# Patient Record
Sex: FEMALE | Race: WHITE | NOT HISPANIC OR LATINO | Employment: PART TIME | URBAN - METROPOLITAN AREA
[De-identification: names, ages, dates, MRNs, and addresses within clinical notes are randomized per-mention and may not be internally consistent; named-entity substitution may affect disease eponyms.]

---

## 2017-04-12 PROCEDURE — G0145 SCR C/V CYTO,THINLAYER,RESCR: HCPCS | Performed by: OBSTETRICS & GYNECOLOGY

## 2017-04-13 ENCOUNTER — LAB REQUISITION (OUTPATIENT)
Dept: LAB | Facility: HOSPITAL | Age: 42
End: 2017-04-13
Payer: COMMERCIAL

## 2017-04-13 DIAGNOSIS — Z11.51 ENCOUNTER FOR SCREENING FOR HUMAN PAPILLOMAVIRUS (HPV): ICD-10-CM

## 2017-04-13 DIAGNOSIS — Z12.72 ENCOUNTER FOR SCREENING FOR MALIGNANT NEOPLASM OF VAGINA: ICD-10-CM

## 2017-04-13 DIAGNOSIS — Z01.419 ENCOUNTER FOR GYNECOLOGICAL EXAMINATION WITHOUT ABNORMAL FINDING: ICD-10-CM

## 2017-04-21 LAB
LAB AP GYN PRIMARY INTERPRETATION: NORMAL
LAB AP LMP: NORMAL
Lab: NORMAL

## 2017-05-31 ENCOUNTER — ALLSCRIPTS OFFICE VISIT (OUTPATIENT)
Dept: OTHER | Facility: OTHER | Age: 42
End: 2017-05-31

## 2017-05-31 PROCEDURE — 88305 TISSUE EXAM BY PATHOLOGIST: CPT | Performed by: SURGERY

## 2017-06-05 ENCOUNTER — LAB REQUISITION (OUTPATIENT)
Dept: LAB | Facility: HOSPITAL | Age: 42
End: 2017-06-05
Payer: COMMERCIAL

## 2017-06-05 DIAGNOSIS — D22.9 MELANOCYTIC NEVUS: ICD-10-CM

## 2017-06-06 ENCOUNTER — ALLSCRIPTS OFFICE VISIT (OUTPATIENT)
Dept: OTHER | Facility: OTHER | Age: 42
End: 2017-06-06

## 2017-06-12 ENCOUNTER — GENERIC CONVERSION - ENCOUNTER (OUTPATIENT)
Dept: OTHER | Facility: OTHER | Age: 42
End: 2017-06-12

## 2018-01-14 VITALS — WEIGHT: 210 LBS | BODY MASS INDEX: 30.06 KG/M2 | HEIGHT: 70 IN

## 2018-01-14 NOTE — MISCELLANEOUS
Message   Date: 12 Jun 2017 11:26 AM EST, Recorded By: Richa Companion   Calling For: Rufina Washburn   Phone: [phone]   Reason: Care Coordination   Left patient a message that biopsy results were benign and showed lesion was a seborrheic keratosis  Asked to call with any questions  Active Problems    1  Acute pharyngitis (462) (J02 9)   2  Suspicious nevus (238 2) (D22 9)    Current Meds   1  Aleve 220 MG Oral Capsule; Therapy: (Recorded:31May2017) to Recorded   2  Folic Acid 1 MG Oral Tablet; Therapy: (Recorded:03Mar2016) to Recorded   3  Motrin  MG Oral Tablet; Therapy: (Recorded:31May2017) to Recorded   4  Multiple Vitamin TABS; Therapy: (Recorded:03Mar2016) to Recorded   5  Xanax 0 25 MG Oral Tablet (ALPRAZolam); TAKE 0 5 TABLET Other; Therapy: (Recorded:31May2017) to Recorded    Allergies    1  Penicillins    2   Latex    Signatures   Electronically signed by : Yulisa Mitchell RN; Jun 12 2017 11:27AM EST                       (Author)

## 2018-01-18 NOTE — MISCELLANEOUS
Message  Return to work or school:   Pb Meadows is under my professional care  She was seen in my office on 3-3-16   She is able to return to work on  3-5-15       Sebastian Calvillo PA-C        Signatures   Electronically signed by : Kathleen Valero, [id number]; Mar  3 2016 10:02AM EST                       (Author)

## 2018-10-23 PROCEDURE — 87624 HPV HI-RISK TYP POOLED RSLT: CPT | Performed by: OBSTETRICS & GYNECOLOGY

## 2018-10-23 PROCEDURE — G0145 SCR C/V CYTO,THINLAYER,RESCR: HCPCS | Performed by: OBSTETRICS & GYNECOLOGY

## 2018-10-24 ENCOUNTER — LAB REQUISITION (OUTPATIENT)
Dept: LAB | Facility: HOSPITAL | Age: 43
End: 2018-10-24
Payer: COMMERCIAL

## 2018-10-24 DIAGNOSIS — Z11.51 ENCOUNTER FOR SCREENING FOR HUMAN PAPILLOMAVIRUS (HPV): ICD-10-CM

## 2018-10-24 DIAGNOSIS — Z01.419 ENCOUNTER FOR GYNECOLOGICAL EXAMINATION WITHOUT ABNORMAL FINDING: ICD-10-CM

## 2018-10-25 ENCOUNTER — TRANSCRIBE ORDERS (OUTPATIENT)
Dept: ADMINISTRATIVE | Facility: HOSPITAL | Age: 43
End: 2018-10-25

## 2018-10-25 DIAGNOSIS — R10.2 PAIN IN PELVIS: Primary | ICD-10-CM

## 2018-10-29 LAB
HPV HR 12 DNA CVX QL NAA+PROBE: NEGATIVE
HPV16 DNA CVX QL NAA+PROBE: NEGATIVE
HPV18 DNA CVX QL NAA+PROBE: NEGATIVE

## 2018-10-31 LAB
LAB AP GYN PRIMARY INTERPRETATION: NORMAL
LAB AP LMP: NORMAL
Lab: NORMAL

## 2018-11-02 ENCOUNTER — HOSPITAL ENCOUNTER (OUTPATIENT)
Dept: RADIOLOGY | Age: 43
Discharge: HOME | End: 2018-11-02
Payer: COMMERCIAL

## 2018-11-02 DIAGNOSIS — R10.32 BILATERAL LOWER ABDOMINAL PAIN: ICD-10-CM

## 2018-11-02 DIAGNOSIS — R10.31 BILATERAL LOWER ABDOMINAL PAIN: ICD-10-CM

## 2018-11-02 DIAGNOSIS — R10.2 PAIN IN PELVIS: ICD-10-CM

## 2018-11-02 DIAGNOSIS — R10.31 ABDOMINAL PAIN, ACUTE, BILATERAL LOWER QUADRANT: ICD-10-CM

## 2018-11-02 DIAGNOSIS — R10.32 ABDOMINAL PAIN, ACUTE, BILATERAL LOWER QUADRANT: ICD-10-CM

## 2018-11-02 DIAGNOSIS — N92.0 EXCESSIVE OR FREQUENT MENSTRUATION: ICD-10-CM

## 2018-11-02 PROCEDURE — 76830 TRANSVAGINAL US NON-OB: CPT

## 2018-11-02 PROCEDURE — 76856 US EXAM PELVIC COMPLETE: CPT

## 2018-11-06 PROBLEM — K59.4 LEVATOR SYNDROME: Status: ACTIVE | Noted: 2018-11-06

## 2018-11-06 PROBLEM — K62.89 ANORECTAL PAIN: Status: ACTIVE | Noted: 2018-11-06

## 2018-11-06 PROBLEM — K59.9 FUNCTIONAL BOWEL DISORDER: Status: ACTIVE | Noted: 2018-11-06

## 2019-02-05 ENCOUNTER — OFFICE VISIT (OUTPATIENT)
Dept: OBGYN CLINIC | Facility: CLINIC | Age: 44
End: 2019-02-05
Payer: COMMERCIAL

## 2019-02-05 VITALS
BODY MASS INDEX: 30.06 KG/M2 | WEIGHT: 210 LBS | DIASTOLIC BLOOD PRESSURE: 80 MMHG | HEIGHT: 70 IN | SYSTOLIC BLOOD PRESSURE: 140 MMHG

## 2019-02-05 DIAGNOSIS — R10.2 CHRONIC PELVIC PAIN IN FEMALE: Primary | ICD-10-CM

## 2019-02-05 DIAGNOSIS — G89.29 CHRONIC PELVIC PAIN IN FEMALE: Primary | ICD-10-CM

## 2019-02-05 PROBLEM — N85.9 LESION OF ENDOMETRIUM: Status: ACTIVE | Noted: 2019-02-05

## 2019-02-05 PROBLEM — N92.0 EXCESSIVE OR FREQUENT MENSTRUATION: Status: ACTIVE | Noted: 2019-02-05

## 2019-02-05 PROCEDURE — 99214 OFFICE O/P EST MOD 30 MIN: CPT | Performed by: OBSTETRICS & GYNECOLOGY

## 2019-02-05 RX ORDER — PHENOL 1.4 %
600 AEROSOL, SPRAY (ML) MUCOUS MEMBRANE 2 TIMES DAILY WITH MEALS
COMMUNITY

## 2019-02-05 RX ORDER — DIPHENHYDRAMINE HYDROCHLORIDE 25 MG/1
25 CAPSULE ORAL DAILY
COMMUNITY

## 2019-02-05 RX ORDER — UBIDECARENONE 75 MG
CAPSULE ORAL DAILY
COMMUNITY

## 2019-02-05 NOTE — PROGRESS NOTES
Pelvic pain since October 2018  Pain is worse with period  Also heavy periods, changing a pad with tampon every 1/2 for the first 24 hours of getting her period  Pt saw Dr Jacob Richard  for normal colonoscopy  Pain is constant, painful to urinate, with bowel movement, and painful intercourse  Pt currently has her cycle

## 2019-02-05 NOTE — PROGRESS NOTES
Patient presents for follow-up for chronic pelvic pain, dyspareunia severe menstrual cramps  Patient had a normal colonoscopy 11 18  Pelvic ultrasound on 11 18 showed the uterus to be 9 3 x 3 8 cm with no fibroids a 9 mm fundal endometrial lesion was noted right ovary was normal     Left ovary showed a 1 cm simple cyst    On physical exam abdomen was soft nontender  External genitalia normal   Vagina clear  Cervix no lesions  Uterus normal size mobile nontender  Right adnexa positive tenderness also in the posterior cul-de-sac  nontender in the  left adnexal    Impression chronic pelvic pain possible endometriosis  Diagnostic laparoscopy discussed and initial consent signed  Patient has an periumbilical mesh which was placed by Dr Jamel Brown  Time of visit 30 min with more than 50% spent with counseling

## 2019-03-07 ENCOUNTER — PREP FOR PROCEDURE (OUTPATIENT)
Dept: OBGYN CLINIC | Facility: CLINIC | Age: 44
End: 2019-03-07

## 2019-03-11 ENCOUNTER — OFFICE VISIT (OUTPATIENT)
Dept: OBGYN CLINIC | Facility: CLINIC | Age: 44
End: 2019-03-11

## 2019-03-11 VITALS
HEIGHT: 70 IN | BODY MASS INDEX: 32.07 KG/M2 | WEIGHT: 224 LBS | DIASTOLIC BLOOD PRESSURE: 60 MMHG | SYSTOLIC BLOOD PRESSURE: 120 MMHG

## 2019-03-11 DIAGNOSIS — Z01.818 PREOP EXAMINATION: Primary | ICD-10-CM

## 2019-03-11 PROCEDURE — PREOP: Performed by: OBSTETRICS & GYNECOLOGY

## 2019-03-11 RX ORDER — OMEPRAZOLE 20 MG/1
20 CAPSULE, DELAYED RELEASE ORAL DAILY
COMMUNITY

## 2019-03-11 NOTE — PROGRESS NOTES
Pt presents for her visit prior to her planned hysteroscopy D&C and diagnostic laparoscopy  She is having this for her pelvic pain and rectal pain  Discussed the surgery with patient and signed consent form  Abdominal prep sheet given to patient  Patient has no concerns at this time

## 2019-03-11 NOTE — PRE-PROCEDURE INSTRUCTIONS
Pre-Surgery Instructions:   Medication Instructions    calcium carbonate (OS-NAT) 600 MG tablet Instructed patient per Anesthesia Guidelines   cyanocobalamin (VITAMIN B-12) 100 mcg tablet Instructed patient per Anesthesia Guidelines   DAILY MULTIPLE VITAMINS tablet Instructed patient per Anesthesia Guidelines   ibuprofen (MOTRIN IB) 200 mg tablet Instructed patient per Anesthesia Guidelines   Naproxen Sodium (ALEVE) 220 MG CAPS Instructed patient per Anesthesia Guidelines   omeprazole (PriLOSEC) 20 mg delayed release capsule Instructed patient per Anesthesia Guidelines   Pyridoxine HCl (VITAMIN B-6) 25 MG tablet Instructed patient per Anesthesia Guidelines  REVIEWED  PRINTED SURGICAL INSTRUCTIONS WITH PATIENT , PATIENT VERBALIZED UNDERSTANDING  MEDICATIONS REVIEWED  NO VITAMINS OR NSAIDS ONE WEEK BEFORE SURGERY, NPO AFTER MIDNIGHT  SOAP AND SHOWER INSTRUCTIONS REVIEWED  NSAID Med Class     Stop taking this medication at least 3 days prior to surgery/procedure

## 2019-03-13 NOTE — H&P
H&P Exam - Gynecology   Terrell Brandt 39 y o  female MRN: [id number]  Unit/Bed#:  Encounter: [id number]      Assessment:  Chronic pelvic pain                           Menorrhagia                           Endometrial lesion    Plan:  Diagnostic hysteroscopy D and C             Diagnostic laparoscopy        HPI:  Terrell Brandt is a 39 y o   female who presents with chronic pelvic pain, dyspareunia, and severe menstrual cramps  Pelvic ultrasound  showed the uterus be 9 3 x 3 8 cm, no fibroids; a 9 mm fundal lesion was noted  Right ovary was normal   Left ovary showed a 1 cm simple cyst   On physical examination she was noted to tenderness in the posterior cul-de-sac and on the right side  The possibility of endometriosis was discussed  Patient periumbilical mesh placed by Dr Maura Diehl for umbilical hernia  Patient's periods have been much heavier,  with severe cramping not relieved with nonsteroidal anti-inflammatory drugs  Patient is not interested in birth control, an IUD or an ablation at this time  She has had 3 spontaneous vaginal deliveries at term   PAP smear 10 18 was normal   All    Historical Information   Past Medical History:   Diagnosis Date    Acid reflux     Hx of migraine headaches     PONV (postoperative nausea and vomiting)     severe     Past Surgical History:   Procedure Laterality Date    COLONOSCOPY      2018    HERNIA REPAIR       OB/GYN History:  x 3      Family History   Problem Relation Age of Onset    Colon cancer Maternal Grandfather     Hypertension Mother     Cancer Sister         thyroid    Cancer Maternal Aunt         thyroid     Social History   Social History     Substance and Sexual Activity   Alcohol Use Yes    Alcohol/week: 0 6 oz    Types: 1 Glasses of wine per week    Comment: daily     Social History     Substance and Sexual Activity   Drug Use No     Social History     Tobacco Use   Smoking Status Never Smoker   Smokeless Tobacco Never Used Meds: see list      Allergies   Allergen Reactions    Latex Hives     Annotation - [id number]: Sensitivity when patient wears it   Penicillins Hives    Sulfa Antibiotics Itching, Rash and GI Intolerance     Severe vomiting       Objective   Vitals: Last menstrual period 02/27/2019  No intake or output data in the 24 hours ending 03/13/19 8137    Invasive Devices: Invasive Devices          None          Physical Exam :  HEENT normal   Chest; clear,heart; regular rate  Abdomen; soft nontender  Breasts; no masses  Pelvic exam   External genitalia normal, vagina clear, no lesions  uterus normal size with no adnexal masses  Tenderness   on the right side as well as tenderness in the posterior cul-de-sac    Rectal exam no masses

## 2019-03-18 ENCOUNTER — ANESTHESIA EVENT (OUTPATIENT)
Dept: PERIOP | Facility: HOSPITAL | Age: 44
End: 2019-03-18
Payer: COMMERCIAL

## 2019-03-18 NOTE — ANESTHESIA PREPROCEDURE EVALUATION
Review of Systems/Medical History  Patient summary reviewed  Chart reviewed  History of anesthetic complications PONV    Cardiovascular  Negative cardio ROS    Pulmonary  Negative pulmonary ROS        GI/Hepatic    GERD ,        Negative  ROS        Endo/Other     GYN       Hematology   Musculoskeletal       Neurology   Psychology           Physical Exam    Airway    Mallampati score: II  TM Distance: >3 FB  Neck ROM: full     Dental       Cardiovascular  Comment: Negative ROS,     Pulmonary      Other Findings        Anesthesia Plan  ASA Score- 2     Anesthesia Type- general with ASA Monitors  Additional Monitors:   Airway Plan: ETT  Comment: TIVA if PONV severe        Plan Factors-    Induction- intravenous  Postoperative Plan-     Informed Consent- Anesthetic plan and risks discussed with patient  I personally reviewed this patient with the CRNA  Discussed and agreed on the Anesthesia Plan with the [place] Hernandez

## 2019-03-19 ENCOUNTER — ANESTHESIA (OUTPATIENT)
Dept: PERIOP | Facility: HOSPITAL | Age: 44
End: 2019-03-19
Payer: COMMERCIAL

## 2019-03-19 ENCOUNTER — HOSPITAL ENCOUNTER (OUTPATIENT)
Facility: HOSPITAL | Age: 44
Setting detail: OUTPATIENT SURGERY
Discharge: HOME | End: 2019-03-19
Attending: OBSTETRICS & GYNECOLOGY | Admitting: OBSTETRICS & GYNECOLOGY
Payer: COMMERCIAL

## 2019-03-19 VITALS
OXYGEN SATURATION: 100 % | DIASTOLIC BLOOD PRESSURE: 71 MMHG | TEMPERATURE: 100.7 F | RESPIRATION RATE: 20 BRPM | HEART RATE: 80 BPM | SYSTOLIC BLOOD PRESSURE: 112 MMHG

## 2019-03-19 DIAGNOSIS — R10.2 CHRONIC PELVIC PAIN IN FEMALE: ICD-10-CM

## 2019-03-19 DIAGNOSIS — G89.29 CHRONIC PELVIC PAIN IN FEMALE: ICD-10-CM

## 2019-03-19 DIAGNOSIS — N85.9 LESION OF ENDOMETRIUM: ICD-10-CM

## 2019-03-19 DIAGNOSIS — N92.0 EXCESSIVE OR FREQUENT MENSTRUATION: ICD-10-CM

## 2019-03-19 PROBLEM — Z98.890 S/P LAPAROSCOPY: Status: ACTIVE | Noted: 2019-03-19

## 2019-03-19 PROBLEM — Z98.890 S/P D&C (STATUS POST DILATION AND CURETTAGE): Status: ACTIVE | Noted: 2019-03-19

## 2019-03-19 LAB — EXT PREGNANCY TEST URINE: NEGATIVE

## 2019-03-19 PROCEDURE — 49329 UNLSTD LAPS PX ABD PERTM&OMN: CPT | Performed by: OBSTETRICS & GYNECOLOGY

## 2019-03-19 PROCEDURE — 58558 HYSTEROSCOPY BIOPSY: CPT | Performed by: OBSTETRICS & GYNECOLOGY

## 2019-03-19 PROCEDURE — 88305 TISSUE EXAM BY PATHOLOGIST: CPT | Performed by: PATHOLOGY

## 2019-03-19 PROCEDURE — 81025 URINE PREGNANCY TEST: CPT | Performed by: OBSTETRICS & GYNECOLOGY

## 2019-03-19 RX ORDER — PROPOFOL 10 MG/ML
INJECTION, EMULSION INTRAVENOUS AS NEEDED
Status: DISCONTINUED | OUTPATIENT
Start: 2019-03-19 | End: 2019-03-19 | Stop reason: SURG

## 2019-03-19 RX ORDER — METOCLOPRAMIDE HYDROCHLORIDE 5 MG/ML
10 INJECTION INTRAMUSCULAR; INTRAVENOUS ONCE AS NEEDED
Status: DISCONTINUED | OUTPATIENT
Start: 2019-03-19 | End: 2019-03-19 | Stop reason: HOSPADM

## 2019-03-19 RX ORDER — SODIUM CHLORIDE, SODIUM LACTATE, POTASSIUM CHLORIDE, CALCIUM CHLORIDE 600; 310; 30; 20 MG/100ML; MG/100ML; MG/100ML; MG/100ML
125 INJECTION, SOLUTION INTRAVENOUS CONTINUOUS
Status: DISCONTINUED | OUTPATIENT
Start: 2019-03-19 | End: 2019-03-19 | Stop reason: HOSPADM

## 2019-03-19 RX ORDER — BUPIVACAINE HYDROCHLORIDE 2.5 MG/ML
INJECTION, SOLUTION INFILTRATION; PERINEURAL AS NEEDED
Status: DISCONTINUED | OUTPATIENT
Start: 2019-03-19 | End: 2019-03-19 | Stop reason: HOSPADM

## 2019-03-19 RX ORDER — METOCLOPRAMIDE HYDROCHLORIDE 5 MG/ML
INJECTION INTRAMUSCULAR; INTRAVENOUS AS NEEDED
Status: DISCONTINUED | OUTPATIENT
Start: 2019-03-19 | End: 2019-03-19 | Stop reason: SURG

## 2019-03-19 RX ORDER — PROPOFOL 10 MG/ML
INJECTION, EMULSION INTRAVENOUS CONTINUOUS PRN
Status: DISCONTINUED | OUTPATIENT
Start: 2019-03-19 | End: 2019-03-19 | Stop reason: SURG

## 2019-03-19 RX ORDER — ACETAMINOPHEN 325 MG/1
975 TABLET ORAL ONCE
Status: COMPLETED | OUTPATIENT
Start: 2019-03-19 | End: 2019-03-19

## 2019-03-19 RX ORDER — OXYCODONE HYDROCHLORIDE AND ACETAMINOPHEN 5; 325 MG/1; MG/1
1 TABLET ORAL EVERY 4 HOURS PRN
Status: DISCONTINUED | OUTPATIENT
Start: 2019-03-19 | End: 2019-03-19 | Stop reason: HOSPADM

## 2019-03-19 RX ORDER — GLYCOPYRROLATE 0.2 MG/ML
INJECTION INTRAMUSCULAR; INTRAVENOUS AS NEEDED
Status: DISCONTINUED | OUTPATIENT
Start: 2019-03-19 | End: 2019-03-19 | Stop reason: SURG

## 2019-03-19 RX ORDER — DEXAMETHASONE SODIUM PHOSPHATE 10 MG/ML
INJECTION, SOLUTION INTRAMUSCULAR; INTRAVENOUS AS NEEDED
Status: DISCONTINUED | OUTPATIENT
Start: 2019-03-19 | End: 2019-03-19 | Stop reason: SURG

## 2019-03-19 RX ORDER — MAGNESIUM HYDROXIDE 1200 MG/15ML
LIQUID ORAL AS NEEDED
Status: DISCONTINUED | OUTPATIENT
Start: 2019-03-19 | End: 2019-03-19 | Stop reason: HOSPADM

## 2019-03-19 RX ORDER — ONDANSETRON 2 MG/ML
INJECTION INTRAMUSCULAR; INTRAVENOUS AS NEEDED
Status: DISCONTINUED | OUTPATIENT
Start: 2019-03-19 | End: 2019-03-19 | Stop reason: SURG

## 2019-03-19 RX ORDER — NEOSTIGMINE METHYLSULFATE 1 MG/ML
INJECTION INTRAVENOUS AS NEEDED
Status: DISCONTINUED | OUTPATIENT
Start: 2019-03-19 | End: 2019-03-19 | Stop reason: SURG

## 2019-03-19 RX ORDER — LIDOCAINE HYDROCHLORIDE 10 MG/ML
INJECTION, SOLUTION INFILTRATION; PERINEURAL AS NEEDED
Status: DISCONTINUED | OUTPATIENT
Start: 2019-03-19 | End: 2019-03-19 | Stop reason: SURG

## 2019-03-19 RX ORDER — PROMETHAZINE HYDROCHLORIDE 25 MG/ML
12.5 INJECTION, SOLUTION INTRAMUSCULAR; INTRAVENOUS ONCE
Status: COMPLETED | OUTPATIENT
Start: 2019-03-19 | End: 2019-03-19

## 2019-03-19 RX ORDER — FENTANYL CITRATE 50 UG/ML
INJECTION, SOLUTION INTRAMUSCULAR; INTRAVENOUS AS NEEDED
Status: DISCONTINUED | OUTPATIENT
Start: 2019-03-19 | End: 2019-03-19 | Stop reason: SURG

## 2019-03-19 RX ORDER — ACETAMINOPHEN 325 MG/1
650 TABLET ORAL EVERY 6 HOURS PRN
Status: DISCONTINUED | OUTPATIENT
Start: 2019-03-19 | End: 2019-03-19 | Stop reason: HOSPADM

## 2019-03-19 RX ORDER — ONDANSETRON 2 MG/ML
4 INJECTION INTRAMUSCULAR; INTRAVENOUS ONCE AS NEEDED
Status: COMPLETED | OUTPATIENT
Start: 2019-03-19 | End: 2019-03-19

## 2019-03-19 RX ORDER — KETOROLAC TROMETHAMINE 30 MG/ML
INJECTION, SOLUTION INTRAMUSCULAR; INTRAVENOUS AS NEEDED
Status: DISCONTINUED | OUTPATIENT
Start: 2019-03-19 | End: 2019-03-19 | Stop reason: SURG

## 2019-03-19 RX ORDER — FENTANYL CITRATE/PF 50 MCG/ML
50 SYRINGE (ML) INJECTION
Status: DISCONTINUED | OUTPATIENT
Start: 2019-03-19 | End: 2019-03-19 | Stop reason: HOSPADM

## 2019-03-19 RX ORDER — ROCURONIUM BROMIDE 10 MG/ML
INJECTION, SOLUTION INTRAVENOUS AS NEEDED
Status: DISCONTINUED | OUTPATIENT
Start: 2019-03-19 | End: 2019-03-19 | Stop reason: SURG

## 2019-03-19 RX ORDER — SCOLOPAMINE TRANSDERMAL SYSTEM 1 MG/1
1 PATCH, EXTENDED RELEASE TRANSDERMAL
Status: DISCONTINUED | OUTPATIENT
Start: 2019-03-19 | End: 2019-03-19 | Stop reason: HOSPADM

## 2019-03-19 RX ORDER — MIDAZOLAM HYDROCHLORIDE 1 MG/ML
INJECTION INTRAMUSCULAR; INTRAVENOUS AS NEEDED
Status: DISCONTINUED | OUTPATIENT
Start: 2019-03-19 | End: 2019-03-19 | Stop reason: SURG

## 2019-03-19 RX ORDER — SODIUM CHLORIDE, SODIUM LACTATE, POTASSIUM CHLORIDE, CALCIUM CHLORIDE 600; 310; 30; 20 MG/100ML; MG/100ML; MG/100ML; MG/100ML
125 INJECTION, SOLUTION INTRAVENOUS CONTINUOUS
Status: DISCONTINUED | OUTPATIENT
Start: 2019-03-19 | End: 2019-03-19 | Stop reason: SDUPTHER

## 2019-03-19 RX ADMIN — FENTANYL CITRATE 50 MCG: 50 INJECTION, SOLUTION INTRAMUSCULAR; INTRAVENOUS at 07:51

## 2019-03-19 RX ADMIN — ROCURONIUM BROMIDE 40 MG: 10 INJECTION INTRAVENOUS at 07:52

## 2019-03-19 RX ADMIN — ROCURONIUM BROMIDE 10 MG: 10 INJECTION INTRAVENOUS at 08:07

## 2019-03-19 RX ADMIN — PROPOFOL 200 MG: 10 INJECTION, EMULSION INTRAVENOUS at 07:51

## 2019-03-19 RX ADMIN — DEXAMETHASONE SODIUM PHOSPHATE 4 MG: 10 INJECTION, SOLUTION INTRAMUSCULAR; INTRAVENOUS at 08:13

## 2019-03-19 RX ADMIN — FENTANYL CITRATE 50 MCG: 50 INJECTION, SOLUTION INTRAMUSCULAR; INTRAVENOUS at 08:22

## 2019-03-19 RX ADMIN — KETOROLAC TROMETHAMINE 30 MG: 30 INJECTION, SOLUTION INTRAMUSCULAR at 09:19

## 2019-03-19 RX ADMIN — SODIUM CHLORIDE, SODIUM LACTATE, POTASSIUM CHLORIDE, AND CALCIUM CHLORIDE: .6; .31; .03; .02 INJECTION, SOLUTION INTRAVENOUS at 07:44

## 2019-03-19 RX ADMIN — NEOSTIGMINE METHYLSULFATE 3 MG: 1 INJECTION INTRAVENOUS at 09:21

## 2019-03-19 RX ADMIN — FENTANYL CITRATE 50 MCG: 50 INJECTION, SOLUTION INTRAMUSCULAR; INTRAVENOUS at 08:10

## 2019-03-19 RX ADMIN — ROCURONIUM BROMIDE 20 MG: 10 INJECTION INTRAVENOUS at 08:41

## 2019-03-19 RX ADMIN — PROPOFOL 120 MCG/KG/MIN: 10 INJECTION, EMULSION INTRAVENOUS at 07:55

## 2019-03-19 RX ADMIN — SODIUM CHLORIDE, SODIUM LACTATE, POTASSIUM CHLORIDE, AND CALCIUM CHLORIDE 125 ML/HR: .6; .31; .03; .02 INJECTION, SOLUTION INTRAVENOUS at 10:15

## 2019-03-19 RX ADMIN — LIDOCAINE HYDROCHLORIDE 50 MG: 10 INJECTION, SOLUTION INFILTRATION; PERINEURAL at 07:51

## 2019-03-19 RX ADMIN — SODIUM CHLORIDE, SODIUM LACTATE, POTASSIUM CHLORIDE, AND CALCIUM CHLORIDE: .6; .31; .03; .02 INJECTION, SOLUTION INTRAVENOUS at 08:31

## 2019-03-19 RX ADMIN — ACETAMINOPHEN 975 MG: 325 TABLET ORAL at 06:51

## 2019-03-19 RX ADMIN — ONDANSETRON 4 MG: 2 INJECTION INTRAMUSCULAR; INTRAVENOUS at 09:54

## 2019-03-19 RX ADMIN — ACETAMINOPHEN 650 MG: 325 TABLET ORAL at 13:02

## 2019-03-19 RX ADMIN — ONDANSETRON 4 MG: 2 INJECTION INTRAMUSCULAR; INTRAVENOUS at 08:13

## 2019-03-19 RX ADMIN — MIDAZOLAM 2 MG: 1 INJECTION INTRAMUSCULAR; INTRAVENOUS at 07:44

## 2019-03-19 RX ADMIN — METOCLOPRAMIDE 10 MG: 5 INJECTION, SOLUTION INTRAMUSCULAR; INTRAVENOUS at 08:13

## 2019-03-19 RX ADMIN — GLYCOPYRROLATE 0.4 MG: 0.2 INJECTION, SOLUTION INTRAMUSCULAR; INTRAVENOUS at 09:21

## 2019-03-19 RX ADMIN — SCOPALAMINE 1 PATCH: 1 PATCH, EXTENDED RELEASE TRANSDERMAL at 07:49

## 2019-03-19 RX ADMIN — FENTANYL CITRATE 50 MCG: 50 INJECTION, SOLUTION INTRAMUSCULAR; INTRAVENOUS at 08:25

## 2019-03-19 RX ADMIN — PROMETHAZINE HYDROCHLORIDE 12.5 MG: 25 INJECTION INTRAMUSCULAR; INTRAVENOUS at 10:15

## 2019-03-19 RX ADMIN — ROCURONIUM BROMIDE 10 MG: 10 INJECTION INTRAVENOUS at 08:21

## 2019-03-19 NOTE — DISCHARGE INSTRUCTIONS
Dilation and Curettage   WHAT YOU NEED TO KNOW:   Dilation and curettage (D&C) is a procedure to remove tissue from the lining of your uterus  DISCHARGE INSTRUCTIONS:   Call 911 for any of the following:   · You have signs of an allergic reaction, such as hives, trouble breathing, or severe swelling  Seek care immediately if:   · You have heavy vaginal bleeding that soaks 1 pad in 1 hour for 2 hours in a row  · You have a fever higher than 100 4°F (38°C)  · You have abdominal cramps for more than 2 days  · Your pain does not get better, even after treatment  Contact your healthcare provider if:   · You have foul-smelling vaginal discharge  · You do not get your monthly period  · You feel depressed or anxious  · You feel very tired and weak  · You have questions or concerns about your condition or care  Medicines: You may need any of the following:  · Prescription pain medicine  may be given  Do not wait until the pain is severe before you take your medicine  Ask your healthcare provider how to take this medicine safely  · Antibiotics  help fight or prevent a bacterial infection  · Take your medicine as directed  Contact your healthcare provider if you think your medicine is not helping or if you have side effects  Tell him or her if you are allergic to any medicine  Keep a list of the medicines, vitamins, and herbs you take  Include the amounts, and when and why you take them  Bring the list or the pill bottles to follow-up visits  Carry your medicine list with you in case of an emergency  Self-care:   · Use sanitary pads if needed  You may have light bleeding for up to 2 weeks  Do not use tampons  Use sanitary pads instead  This will help prevent a vaginal infection  · Rest as needed  Slowly start to do more each day  Return to your daily activities as directed  · Do not have sex for at least 2 weeks after the procedure  This will help prevent an infection      · Use birth control right after your procedure  Your monthly period should start again in 4 to 8 weeks  During this time, you could still ovulate (release an egg)  Use birth control as directed to prevent pregnancy during this time  Follow up with your healthcare provider as directed:  Write down your questions so you remember to ask them during your visits  Endometriosis   WHAT YOU NEED TO KNOW:   Endometriosis is a condition in which tissue that is normally only in your uterus grows outside of the uterus  Endometriosis causes tissue that should be shed during a monthly period to grow on your ovaries, fallopian tubes, bladder, or other organs  Organs and tissue may stick together and cause inflammation and pain  DISCHARGE INSTRUCTIONS:   Seek care immediately if:   · You have severe pain that does not go away after you take pain medicine  Contact your healthcare provider if:   · Your symptoms return after treatment  · You have heavy or unusual vaginal bleeding  · You see blood in your urine or bowel movement  · You have questions or concerns about your condition or care  Medicines:   · Hormones  may help shrink endometrial tissue and decrease pain and inflammation  You may be given birth control pills, androgen hormones, or medicine that makes your body produce less of certain hormones  · Acetaminophen  decreases pain and is available without a doctor's order  Ask how much to take and how often to take it  Follow directions  Acetaminophen can cause liver damage if not taken correctly  · NSAIDs , such as ibuprofen, help decrease swelling, pain, and fever  This medicine is available with or without a doctor's order  NSAIDs can cause stomach bleeding or kidney problems in certain people  If you take blood thinner medicine, always ask your healthcare provider if NSAIDs are safe for you  Always read the medicine label and follow directions      Self-care:   · Apply heat  on your abdomen for 20 to 30 minutes every 2 hours for as many days as directed  Heat helps decrease pain and muscle spasms  · Exercise regularly  to help reduce symptoms, such as pain  Ask about the best exercise plan for you  For support and more information:   · [employer]  [address] [place] [address]  Phone: [phone]  Phone: [phone]  Web Address: [email or web address]  [employer]  Follow up with your healthcare provider as directed:  Write down your questions so you remember to ask them during your visits

## 2019-03-19 NOTE — ANESTHESIA POSTPROCEDURE EVALUATION
Post-Op Assessment Note    CV Status:  Stable  Pain Score: 0    Pain management: adequate     Mental Status:  Alert and awake   Hydration Status:  Euvolemic   PONV Controlled:  Controlled   Airway Patency:  Patent   Post Op Vitals Reviewed: Yes      Staff: CRNA           BP   133/79   Temp   98 2   Pulse  84   Resp   14   SpO2 100

## 2019-03-19 NOTE — OP NOTE
OPERATIVE REPORT  PATIENT NAME: Fer Wade    :  1975  MRN: [id number]  Pt Location: [facility] OR ROOM 05    SURGERY DATE: 3/19/2019    Surgeon(s) and Role:     * Hema Rocha MD - Primary     * Hema Jane MD - Assisting    Preop Diagnosis:  Chronic pelvic pain in female [R10 2, G89 29]  Excessive or frequent menstruation [N92 0]  Lesion of endometrium [N85 8]    Post-Op Diagnosis Codes:     * Chronic pelvic pain in female [R10 2, G89 29]     * Excessive or frequent menstruation [N92 0]     * Lesion of endometrium [N85 8]    Procedure(s) (LRB):  HYSTEROSCOPY (N/A)  DILATATION AND CURETTAGE (D&C) (N/A)  LAPAROSCOPY DIAGNOSTIC (N/A)  LYSIS ADHESIONS-abdominal omental-laparoscopic (N/A)    Specimen(s):  ID Type Source Tests Collected by Time Destination   1 : endometrial polyp Tissue Polyp, Cervical/Endometrial TISSUE EXAM Hema Rocha MD 3/19/2019 5623    2 : [facility] Tissue Endometrium TISSUE EXAM Hema Rocha MD 3/19/2019 0412        Estimated Blood Loss:   10cc    Drains:  [REMOVED] Urethral Catheter Non-latex 16 Fr  (Removed)   Number of days: 0       Anesthesia Type:   General    Operative Indications:  Chronic pelvic pain in female [R10 2, G89 29]  Excessive or frequent menstruation [N92 0]  Lesion of endometrium [N85 8]    Operative Findings:  Mid position uterus with mottling  Fibrotic rents and the posterior cul-de-sac and bilateral ovarian fossa suggestive of old endometriosis  Grossly normal tubes and ovaries  Clear endometrial lesion in the right pelvis  Omental adhesions to the anterior abdominal wall without bowel  Grossly normal external genitalia and vagina  Parous appearing cervix without lesions  Uterus sounded to 8 cm  Smooth endometrium with small endometrial polypoid tissue in the lower uterine segment    Complications:   None apparent    Procedure and Technique:  Patient was brought to the operating room where timeout was performed to confirm correct patient and correct procedure    General anesthesia was established  SCDs were placed bilaterally, and the patient was positioned on the operating table in dorsal lithotomy position with legs put up in stirrups  All pressure points were padded and a Amador hugger was placed to maintain control of core body temperature  The patient was then prepped and draped in usual sterile fashion  A bimanual exam was performed and the uterus was found to be mid position   The adnexa were palpated bilaterally and there are no palpable adnexal masses  A Adorno catheter was placed into the bladder  Weighted speculum was inserted into the vagina and the cervix was visualized and grasped using a single-tooth tenaculum  A dilator was then placed through the cervical os and secured to the tenaculum and used to perform a uterine manipulation  The speculum was then removed from the vagina  Gloves were exchanged and attention was turned to the abdomen  A 5 mm horizontal incision was made infraumbilical and a 5 mm trocar was introduced under direct visualization with the laparoscope within the trocar  When intra-abdominal placement had been confirmed, the trocar was removed, leaving sleeve in place  The laparoscope was then placed through the trocar and pneumoperitoneum was established to a maximum of 15 mmHg  The patient was placed in Trendelenburg  The uterus ovaries and fallopian tubes appeared grossly normal   The liver edge appeared grossly normal   Subsequently, two additional small incisions were made and Two more ports ( 5mm, 5mm) were introduced under direct visualization  A blunt-tip probe was used to mobilize the bowel and the pelvis and posterior cul-de-sac were both visualized  Here fibrotic rents were noted across the posterior portion of the pelvis and the anterior cul-de-sac suggestive of old endometrium  On the right side of the pelvic sidewall that was also noted clear lesions noted to be probable endometriosis    The uterus was freely mobile with mottling noted at the fundus, tubes and ovaries were grossly normal   Significant omental adhesions were noted at the anterior abdominal wall  These were explored and noted to contain no bowel  Lysis of adhesions then commenced with the EnSeal device until it mentally he adhesions were freed from the anterior abdominal wall  The previous abdominal mesh was visualized  The trochars were then removed  Pneumoperitoneum was evacuated and the skin was reapproximated using histiacryl  Attention was then turned to the perineum  The dilator was removed  A weighted speculum was inserted into the vagina and a Lenora retractor was used to visualize the anterior lip of the cervix, which was then grasped with a single toothed tenaculum  The uterus was sounded to 8cm  The cervix was serially dilated to 21Fr using Gallo dilators for introduction of the hysteroscope  Hysteroscope was introduced under direct visualization using normal saline solution as the distention media  Hysteroscope was advanced to the uterine fundus and the entire uterine cavity was inspected in a systematic manner  There was noted to be mostly smooth endometrial tissue with polypoid tissue noted at the anterior lower uterine segment  Hysteroscope was withdrawn and polyp forceps were inserted to remove specimen that was identified as endometrial polyp  Sharp curetting was performed, starting at the 12'oclock position and rotating a total of 360 degrees to cover all surfaces  Endometrial tissue was obtained and sent for pathology  The single toothed tenaculum was removed from the anterior lip of the cervix  Good hemostasis was confirmed at the tenaculum puncture sites  Weighted speculum was then removed from the vagina  At the conclusion of the procedure, all needle, sponge, and instrument counts were noted to be correct x2   Patient tolerated the procedure well and was transferred to PACU in stable condition prior to discharge with follow up in 1-2 weeks  Dr Doug De La Fuente was present and participated in all key portions of the case        Patient Disposition:  PACU     SIGNATURE: Bakari Yuan MD  DATE: March 19, 2019  TIME: 9:41 AM

## 2019-03-20 ENCOUNTER — TELEPHONE (OUTPATIENT)
Dept: OBGYN CLINIC | Facility: CLINIC | Age: 44
End: 2019-03-20

## 2019-03-25 ENCOUNTER — OFFICE VISIT (OUTPATIENT)
Dept: OBGYN CLINIC | Facility: CLINIC | Age: 44
End: 2019-03-25

## 2019-03-25 VITALS
WEIGHT: 221 LBS | HEIGHT: 68 IN | DIASTOLIC BLOOD PRESSURE: 80 MMHG | SYSTOLIC BLOOD PRESSURE: 120 MMHG | BODY MASS INDEX: 33.49 KG/M2

## 2019-03-25 DIAGNOSIS — Z98.890 S/P D&C (STATUS POST DILATION AND CURETTAGE): ICD-10-CM

## 2019-03-25 DIAGNOSIS — Z98.890 S/P LAPAROSCOPY: Primary | ICD-10-CM

## 2019-03-25 PROCEDURE — 99024 POSTOP FOLLOW-UP VISIT: CPT | Performed by: OBSTETRICS & GYNECOLOGY

## 2019-03-25 NOTE — PROGRESS NOTES
Pt presents post op s/p laparoscopic lysis of adhesions and hysteroscopic D&C and removal of endometrial polyp  Pt states she is still having some aching pain but states that her severe pain prior to surgery is gone  Reviewed pathology results - endometrial polyp and endometrium curettings had no hyperplasia or carcinoma  Photos shown and explained to pt  Physical Exam:  Mild bruising around incision sites - no redness, swelling, discharge or signs of infection      Impression:   initial abdominal pain resolved   benign endometrial polyp    Plan:  Observe periods  Return to office for yearly exam

## 2019-10-29 ENCOUNTER — ANNUAL EXAM (OUTPATIENT)
Dept: OBGYN CLINIC | Facility: CLINIC | Age: 44
End: 2019-10-29
Payer: COMMERCIAL

## 2019-10-29 VITALS
SYSTOLIC BLOOD PRESSURE: 122 MMHG | BODY MASS INDEX: 33.03 KG/M2 | DIASTOLIC BLOOD PRESSURE: 80 MMHG | HEIGHT: 69 IN | WEIGHT: 223 LBS

## 2019-10-29 DIAGNOSIS — Z12.31 ENCOUNTER FOR SCREENING MAMMOGRAM FOR BREAST CANCER: ICD-10-CM

## 2019-10-29 DIAGNOSIS — F41.9 ANXIETY: ICD-10-CM

## 2019-10-29 DIAGNOSIS — Z01.419 ENCOUNTER FOR WELL WOMAN EXAM: Primary | ICD-10-CM

## 2019-10-29 DIAGNOSIS — R53.83 FATIGUE, UNSPECIFIED TYPE: ICD-10-CM

## 2019-10-29 PROCEDURE — S0612 ANNUAL GYNECOLOGICAL EXAMINA: HCPCS | Performed by: PHYSICIAN ASSISTANT

## 2019-10-29 RX ORDER — FLUOXETINE 10 MG/1
10 TABLET ORAL DAILY
Qty: 90 TABLET | Refills: 1 | Status: SHIPPED | OUTPATIENT
Start: 2019-10-29

## 2019-10-29 RX ORDER — CALCIUM CARBONATE 200(500)MG
TABLET,CHEWABLE ORAL
COMMUNITY
End: 2019-10-29 | Stop reason: ALTCHOICE

## 2019-10-29 RX ORDER — MECLIZINE HYDROCHLORIDE 25 MG/1
TABLET ORAL AS NEEDED
COMMUNITY
Start: 2016-02-04

## 2019-10-29 NOTE — PROGRESS NOTES
Assessment/Plan:    No problem-specific Assessment & Plan notes found for this encounter  Diagnoses and all orders for this visit:    Encounter for well woman exam    Encounter for screening mammogram for breast cancer  -     Mammo screening bilateral w 3d & cad; Future    Anxiety  -     Fluoxetine HCl, PMDD, (SARAFEM) 10 MG TABS; Take 1 tablet (10 mg total) by mouth daily    Fatigue, unspecified type  -     CBC and differential; Future  -     TSH, 3rd generation with Free T4 reflex; Future  -     Comprehensive metabolic panel; Future  -     Lipid panel; Future    Other orders  -     Discontinue: calcium carbonate (TUMS) 500 mg chewable tablet  -     meclizine (ANTIVERT) 25 mg tablet; Take by mouth as needed           Subjective:      Patient ID: Heriberto Perla is a 38 y o  female  Pt presents for her annual exam today--  She has complaints of fatigue, hot flashes daily (same as last year), and difficulty sleeping--  otc remedies not helping  Periods are regular  Pain has improved  Has dx lap in spring 2019--adhesions  Bowel and bladder are regular  +IBS fairly well controlled  No breast concerns today  Last mammo--4/19    No pap today  rx mammo  rx tsh     rx sarafem  B and D      The following portions of the patient's history were reviewed and updated as appropriate: allergies, current medications, past family history, past medical history, past social history, past surgical history and problem list     Review of Systems   Constitutional: Negative for chills, fever and unexpected weight change  Gastrointestinal: Negative for abdominal pain, blood in stool, constipation and diarrhea  Genitourinary: Negative  Objective:      /80 (BP Location: Left arm, Patient Position: Sitting, Cuff Size: Standard)   Ht 5' 9" (1 753 m)   Wt 101 kg (223 lb)   LMP 10/05/2019 (Exact Date)   BMI 32 93 kg/m²          Physical Exam   Constitutional: She appears well-developed and well-nourished     HENT: Head: Normocephalic and atraumatic  Neck: Normal range of motion  Pulmonary/Chest: Right breast exhibits no inverted nipple, no mass, no nipple discharge and no skin change  Left breast exhibits no inverted nipple, no mass, no nipple discharge and no skin change  Abdominal: Soft  Genitourinary: Vagina normal and uterus normal  Pelvic exam was performed with patient supine  There is no rash, tenderness or lesion on the right labia  There is no rash, tenderness or lesion on the left labia  Cervix exhibits no motion tenderness, no discharge and no friability  Right adnexum displays no mass, no tenderness and no fullness  Left adnexum displays no mass, no tenderness and no fullness  Lymphadenopathy: No inguinal adenopathy noted on the right or left side  Nursing note and vitals reviewed

## 2019-10-29 NOTE — PROGRESS NOTES
The patient is here for a yearly  The patient is not due for a pap smear  The patient has regular periods  The patient has pelvic cramping  The patient also has hot flashes and trouble sleeping  The patient has urinary frequency  No urinary leakage  No breast concerns

## 2019-11-01 ENCOUNTER — TELEPHONE (OUTPATIENT)
Dept: OBGYN CLINIC | Facility: CLINIC | Age: 44
End: 2019-11-01

## 2019-11-01 NOTE — TELEPHONE ENCOUNTER
Pt needs to try another generic before Fluoxetine HCI 10mgs is covered by her insurance  Per [facility] if patient is put on Escitalopram 10mgs OD it would be covered

## 2019-11-04 NOTE — TELEPHONE ENCOUNTER
We dont usually prescribe Lexapro   usually Fluoxetine 10mg or Zoloft 25mg  If pt want to try the Lexapro, should probably have the PCP manage  We can give the intitial 30 day Rx to start

## 2019-11-04 NOTE — TELEPHONE ENCOUNTER
Pt advised  Will contact insurance and see if Zoloft is covered because she just wants it for anxiety and not depression   She will call back with her decision

## 2020-07-10 DIAGNOSIS — Z12.31 ENCOUNTER FOR SCREENING MAMMOGRAM FOR BREAST CANCER: ICD-10-CM

## 2021-03-02 ENCOUNTER — OFFICE VISIT (OUTPATIENT)
Dept: OBGYN CLINIC | Facility: CLINIC | Age: 46
End: 2021-03-02
Payer: COMMERCIAL

## 2021-03-02 VITALS
WEIGHT: 230 LBS | HEIGHT: 69 IN | DIASTOLIC BLOOD PRESSURE: 80 MMHG | BODY MASS INDEX: 34.07 KG/M2 | SYSTOLIC BLOOD PRESSURE: 124 MMHG

## 2021-03-02 DIAGNOSIS — L73.9 FOLLICULITIS: ICD-10-CM

## 2021-03-02 DIAGNOSIS — L72.3 SEBACEOUS CYST: ICD-10-CM

## 2021-03-02 DIAGNOSIS — F41.9 ANXIETY: ICD-10-CM

## 2021-03-02 DIAGNOSIS — N90.7 VULVAR CYSTS: Primary | ICD-10-CM

## 2021-03-02 DIAGNOSIS — L29.9 ITCHING: ICD-10-CM

## 2021-03-02 DIAGNOSIS — N94.3 PMS (PREMENSTRUAL SYNDROME): ICD-10-CM

## 2021-03-02 PROCEDURE — 99213 OFFICE O/P EST LOW 20 MIN: CPT | Performed by: PHYSICIAN ASSISTANT

## 2021-03-03 RX ORDER — FLUOXETINE 20 MG/1
1 TABLET ORAL DAILY
Qty: 30 TABLET | Refills: 3 | Status: SHIPPED | OUTPATIENT
Start: 2021-03-03 | End: 2021-04-06 | Stop reason: SDUPTHER

## 2021-03-03 NOTE — PROGRESS NOTES
Assessment/Plan:    No problem-specific Assessment & Plan notes found for this encounter  Diagnoses and all orders for this visit:    Vulvar cysts    Sebaceous cyst    Folliculitis  -     CBC and differential; Future  -     TSH, 3rd generation with Free T4 reflex; Future  -     Comprehensive metabolic panel; Future  -     Bile acids, total; Future  -     Glucose, fasting; Future    Itching  -     CBC and differential; Future  -     TSH, 3rd generation with Free T4 reflex; Future  -     Comprehensive metabolic panel; Future  -     Bile acids, total; Future  -     Glucose, fasting; Future    Anxiety  -     Fluoxetine HCl, PMDD, (Sarafem) 20 MG TABS; Take 1 tablet (20 mg total) by mouth daily    PMS (premenstrual syndrome)  -     Fluoxetine HCl, PMDD, (Sarafem) 20 MG TABS; Take 1 tablet (20 mg total) by mouth daily          Subjective:      Patient ID: Bipin Gibson is a 54 y o  female  Pt presents for a problem today  She has noticed some "bumps" on her right labia x several days  They are tender  No vaginal itching, burning or discharge  Periods are regular  No urinary sxs  She also gets a recurrent "pimple" on her left side  occ drains blood  H/o HS in axilla b/l    rx bw  rx sarafem for pms/anxiety      The following portions of the patient's history were reviewed and updated as appropriate: allergies, current medications, past family history, past medical history, past social history, past surgical history and problem list     Review of Systems   Constitutional: Negative for chills, fever and unexpected weight change  Gastrointestinal: Negative for abdominal pain, blood in stool, constipation and diarrhea  Genitourinary: Positive for vaginal pain  Negative for vaginal bleeding and vaginal discharge  Objective:      /80   Ht 5' 9" (1 753 m)   Wt 104 kg (230 lb)   LMP 02/21/2021 (Within Days)   BMI 33 97 kg/m²          Physical Exam  Vitals signs and nursing note reviewed  Constitutional:       Appearance: She is well-developed  Genitourinary:     Exam position: Supine  Labia:         Right: Lesion present  No rash  Left: No rash or lesion  Vagina: Normal       Cervix: No cervical motion tenderness, discharge or friability  Comments: Several sebaceous cysts right labia  1 area of folliculitis left inguinal fold   No active bleeding or drainage  Lymphadenopathy:      Lower Body: No right inguinal adenopathy  No left inguinal adenopathy

## 2021-03-04 ENCOUNTER — TELEPHONE (OUTPATIENT)
Dept: OBGYN CLINIC | Facility: CLINIC | Age: 46
End: 2021-03-04

## 2021-03-04 NOTE — TELEPHONE ENCOUNTER
Pharmacist called and pt's insurance doesn't cover tablets of the Sarafem it will only cover capsules so I advised pharmacist it is ok to change to capsules

## 2021-04-06 ENCOUNTER — ANNUAL EXAM (OUTPATIENT)
Dept: OBGYN CLINIC | Facility: CLINIC | Age: 46
End: 2021-04-06
Payer: COMMERCIAL

## 2021-04-06 VITALS
DIASTOLIC BLOOD PRESSURE: 82 MMHG | HEIGHT: 69 IN | WEIGHT: 231 LBS | SYSTOLIC BLOOD PRESSURE: 124 MMHG | BODY MASS INDEX: 34.21 KG/M2

## 2021-04-06 DIAGNOSIS — Z12.31 ENCOUNTER FOR SCREENING MAMMOGRAM FOR BREAST CANCER: ICD-10-CM

## 2021-04-06 DIAGNOSIS — N94.3 PMS (PREMENSTRUAL SYNDROME): ICD-10-CM

## 2021-04-06 DIAGNOSIS — F41.9 ANXIETY: ICD-10-CM

## 2021-04-06 DIAGNOSIS — Z12.39 ENCOUNTER FOR SCREENING BREAST EXAMINATION: ICD-10-CM

## 2021-04-06 DIAGNOSIS — Z01.419 ENCOUNTER FOR WELL WOMAN EXAM: Primary | ICD-10-CM

## 2021-04-06 PROCEDURE — S0612 ANNUAL GYNECOLOGICAL EXAMINA: HCPCS | Performed by: PHYSICIAN ASSISTANT

## 2021-04-06 RX ORDER — FLUOXETINE 20 MG/1
1 TABLET ORAL DAILY
Qty: 30 TABLET | Refills: 5 | Status: SHIPPED | OUTPATIENT
Start: 2021-04-06

## 2021-04-06 RX ORDER — FLUOXETINE 10 MG/1
20 CAPSULE ORAL DAILY
Qty: 90 CAPSULE | Refills: 4 | Status: CANCELLED | OUTPATIENT
Start: 2021-04-06

## 2021-04-06 NOTE — PROGRESS NOTES
Assessment/Plan:    No problem-specific Assessment & Plan notes found for this encounter  Diagnoses and all orders for this visit:    Encounter for well woman exam    Encounter for screening breast examination    Encounter for screening mammogram for breast cancer  -     Mammo screening bilateral w 3d & cad; Future    PMS (premenstrual syndrome)  -     Fluoxetine HCl, PMDD, (Sarafem) 20 MG TABS; Take 1 tablet (20 mg total) by mouth daily    Anxiety  -     Fluoxetine HCl, PMDD, (Sarafem) 20 MG TABS; Take 1 tablet (20 mg total) by mouth daily    Other orders  -     Cancel: FLUoxetine (PROzac) 10 mg capsule; Take 2 capsules (20 mg total) by mouth daily          Subjective:      Patient ID: Ruperto Humphrey is a 51 y o  female  Pt presents for her annual exam today--  She has no complaints  Her PMS has been better since starting Sarafem   the patient is considering taking it every day (vs 2 weeks of the month)  She has regular bleeding, no pelvic pain  occ hot flash  Did change products and added witch hazel to regimen since last ov--dx with sebaceous cysts and chronic folliculitis  Bowel and bladder are regular  No breast concerns today  Last mammo--7/2020      No pap today  Due in ocotober  rx mammo  rx sarafem 20mg  B and D      The following portions of the patient's history were reviewed and updated as appropriate: allergies, current medications, past family history, past medical history, past social history, past surgical history and problem list     Review of Systems   Constitutional: Negative for chills, fever and unexpected weight change  Gastrointestinal: Negative for abdominal pain, blood in stool, constipation and diarrhea  Genitourinary: Negative  Objective:      /82   Ht 5' 9" (1 753 m)   Wt 105 kg (231 lb)   LMP 03/23/2021 (Exact Date)   Breastfeeding No   BMI 34 11 kg/m²          Physical Exam  Vitals signs and nursing note reviewed     Constitutional:       Appearance: She is well-developed  HENT:      Head: Normocephalic and atraumatic  Neck:      Musculoskeletal: Normal range of motion  Chest:      Breasts:         Right: No inverted nipple, mass, nipple discharge or skin change  Left: No inverted nipple, mass, nipple discharge or skin change  Abdominal:      Palpations: Abdomen is soft  Genitourinary:     Exam position: Supine  Labia:         Right: No rash, tenderness or lesion  Left: No rash, tenderness or lesion  Vagina: Normal       Cervix: No cervical motion tenderness, discharge or friability  Adnexa:         Right: No mass, tenderness or fullness  Left: No mass, tenderness or fullness  Lymphadenopathy:      Lower Body: No right inguinal adenopathy  No left inguinal adenopathy

## 2021-04-06 NOTE — PROGRESS NOTES
Patient is here for yearly exam Patient is having regular cycles  Patient has no breast concerns, has her mammogram scheduled for next week  Patient was seen on 3/2/21 for a vulvar cyst  She states it has no gotten bigger but is still bothering her  She has been using witch hazel on it and it is no improving  B&B ok, some IBS  Patient is not due for a pap smear at this visit  10/23/18 Normal Pap, Negative HPV    1/12/17 normal Pap  4/6/16 Normal Pap    7/8/20 Normal Mammo

## 2021-07-26 DIAGNOSIS — F41.9 ANXIETY: Primary | ICD-10-CM

## 2021-07-26 RX ORDER — ALPRAZOLAM 0.5 MG/1
0.5 TABLET ORAL ONCE AS NEEDED
Qty: 1 TABLET | Refills: 0 | Status: SHIPPED | OUTPATIENT
Start: 2021-07-26

## 2021-07-26 NOTE — TELEPHONE ENCOUNTER
Pt called asking if she could be given medication to relax her for her MRI tomorrow        Per Petra pt advised that we will give her xanax 0 5mg, 1 with 0 refills

## 2023-10-23 ENCOUNTER — ANNUAL EXAM (OUTPATIENT)
Dept: GYNECOLOGY | Facility: CLINIC | Age: 48
End: 2023-10-23
Payer: COMMERCIAL

## 2023-10-23 VITALS
SYSTOLIC BLOOD PRESSURE: 122 MMHG | HEIGHT: 69 IN | DIASTOLIC BLOOD PRESSURE: 86 MMHG | BODY MASS INDEX: 35.99 KG/M2 | WEIGHT: 243 LBS

## 2023-10-23 DIAGNOSIS — N92.0 MENORRHAGIA WITH REGULAR CYCLE: ICD-10-CM

## 2023-10-23 DIAGNOSIS — Z11.51 SCREENING FOR HPV (HUMAN PAPILLOMAVIRUS): ICD-10-CM

## 2023-10-23 DIAGNOSIS — Z12.31 SCREENING MAMMOGRAM FOR BREAST CANCER: ICD-10-CM

## 2023-10-23 DIAGNOSIS — Z01.419 ROUTINE GYNECOLOGICAL EXAMINATION: Primary | ICD-10-CM

## 2023-10-23 PROCEDURE — S0612 ANNUAL GYNECOLOGICAL EXAMINA: HCPCS | Performed by: OBSTETRICS & GYNECOLOGY

## 2023-10-23 PROCEDURE — G0476 HPV COMBO ASSAY CA SCREEN: HCPCS | Performed by: OBSTETRICS & GYNECOLOGY

## 2023-10-23 PROCEDURE — G0145 SCR C/V CYTO,THINLAYER,RESCR: HCPCS | Performed by: OBSTETRICS & GYNECOLOGY

## 2023-10-23 NOTE — PROGRESS NOTES
Assessment/Plan:  Normal breast and GYN exam  Normal Pap negative HPV 2018  Menorrhagia with regular menstrual cycles. Laparoscopic lysis of adhesions and hysteroscopy polypectomy   Normal colonoscopy in 2018 normal breast MRI 2021    Plan: Information on endometrial ablation given. Return to office after next menstrual cycle for endometrial biopsy. Rx mammogram.    Subjective:       Patient ID: Lansing Dubin is a 51 y.o. female presents to the office for annual exam complaining of heavy menstrual cycles. Her menstrual cycles are monthly but lasting 10 to 14 days. First 4 days are heavy with clots. Can only take Motrin once a day secondary to GI symptoms. Denies any pelvic pain or dyspareunia. History of endometrial polyps. Denies any breast bowel or bladder problems. Occasional hot flashes. Also complaining of weight gain. Not interested in weight loss management. Ultrasound 2018 showed the uterus to be 9.3 x 3.8 cm without fibroids. Normal ovaries. Family physician blood work: Normal TSH, normal estradiol, FSH 14.77. Hemoglobin 12    Working full-time. Medications reviewed. Not interested in birth control pills or an IUD. Patient has a periumbilical mesh    Review of Systems   Constitutional: Negative. Negative for fatigue, fever and unexpected weight change. HENT: Negative. Eyes: Negative. Respiratory: Negative. Negative for chest tightness, shortness of breath, wheezing and stridor. Cardiovascular: Negative. Negative for chest pain, palpitations and leg swelling. Gastrointestinal: Negative. Negative for abdominal pain, blood in stool, diarrhea, nausea, rectal pain and vomiting. Endocrine: Negative. Genitourinary:  Negative for dysuria, frequency, vaginal bleeding, vaginal discharge and vaginal pain. Musculoskeletal: Negative. Skin: Negative. Allergic/Immunologic: Negative. Neurological: Negative. Hematological: Negative. Psychiatric/Behavioral: Negative. All other systems reviewed and are negative. Objective:      /86   Ht 5' 9" (1.753 m)   Wt 110 kg (243 lb)   LMP 09/29/2023 (Exact Date)   BMI 35.88 kg/m²          Physical Exam  Constitutional:       Appearance: She is well-developed. She is obese. HENT:      Head: Normocephalic and atraumatic. Neck:      Thyroid: No thyromegaly. Trachea: No tracheal deviation. Cardiovascular:      Rate and Rhythm: Normal rate and regular rhythm. Heart sounds: Normal heart sounds. Pulmonary:      Effort: Pulmonary effort is normal. No respiratory distress. Breath sounds: Normal breath sounds. No stridor. No wheezing or rales. Chest:      Chest wall: No tenderness. Breasts:     Breasts are symmetrical.      Right: No inverted nipple, mass, nipple discharge, skin change or tenderness. Left: No inverted nipple, mass, nipple discharge, skin change or tenderness. Abdominal:      General: Bowel sounds are normal. There is no distension. Palpations: Abdomen is soft. There is no mass. Tenderness: There is no abdominal tenderness. There is no guarding or rebound. Hernia: No hernia is present. There is no hernia in the left inguinal area. Genitourinary:     Labia:         Right: No rash, tenderness, lesion or injury. Left: No rash, tenderness, lesion or injury. Vagina: Normal. No signs of injury and foreign body. No vaginal discharge, erythema, tenderness or bleeding. Cervix: No cervical motion tenderness, discharge or friability. Uterus: Not deviated, not enlarged, not fixed and not tender. Adnexa:         Right: No mass, tenderness or fullness. Left: No mass, tenderness or fullness. Rectum: No mass, anal fissure, external hemorrhoid or internal hemorrhoid. Musculoskeletal:      Cervical back: Normal range of motion and neck supple.    Lymphadenopathy:      Lower Body: No right inguinal adenopathy. No left inguinal adenopathy. Skin:     General: Skin is warm and dry. Neurological:      Mental Status: She is alert and oriented to person, place, and time. Psychiatric:         Behavior: Behavior normal.         Thought Content:  Thought content normal.         Judgment: Judgment normal.

## 2023-10-30 LAB
LAB AP GYN PRIMARY INTERPRETATION: NORMAL
Lab: NORMAL

## 2024-01-31 ENCOUNTER — NEW PATIENT COMPREHENSIVE (OUTPATIENT)
Dept: URBAN - METROPOLITAN AREA CLINIC 6 | Facility: CLINIC | Age: 49
End: 2024-01-31

## 2024-01-31 DIAGNOSIS — G43.109: ICD-10-CM

## 2024-01-31 PROCEDURE — 92004 COMPRE OPH EXAM NEW PT 1/>: CPT

## 2024-01-31 ASSESSMENT — TONOMETRY
OD_IOP_MMHG: 16
OS_IOP_MMHG: 16

## 2024-01-31 ASSESSMENT — VISUAL ACUITY
OD_SC: 20/30
OS_SC: 20/20-1

## (undated) DEVICE — MAYO STAND COVER: Brand: CONVERTORS

## (undated) DEVICE — SYRINGE 50ML LL

## (undated) DEVICE — SCD SEQUENTIAL COMPRESSION COMFORT SLEEVE MEDIUM KNEE LENGTH: Brand: KENDALL SCD

## (undated) DEVICE — STOPCOCK 4-WAY

## (undated) DEVICE — TRAY FOLEY 16FR URIMETER SILICONE SURESTEP

## (undated) DEVICE — TUBING SUCTION 5MM X 12 FT

## (undated) DEVICE — ADHESIVE SKN CLSR HISTOACRYL FLEX 0.5ML LF

## (undated) DEVICE — PREMIUM DRY TRAY LF: Brand: MEDLINE INDUSTRIES, INC.

## (undated) DEVICE — 3000CC GUARDIAN II: Brand: GUARDIAN

## (undated) DEVICE — Device: Brand: MEDEX

## (undated) DEVICE — IV SET 15 DROP STERILE 0/Y GRAVITY

## (undated) DEVICE — TROCAR: Brand: KII FIOS FIRST ENTRY

## (undated) DEVICE — INTENDED FOR TISSUE SEPARATION, AND OTHER PROCEDURES THAT REQUIRE A SHARP SURGICAL BLADE TO PUNCTURE OR CUT.: Brand: BARD-PARKER SAFETY BLADES SIZE 11, STERILE

## (undated) DEVICE — INSUFLATION TUBING INSUFLOW (LEXION)

## (undated) DEVICE — CHLORAPREP HI-LITE 26ML ORANGE

## (undated) DEVICE — ENSEAL LAPAROSCOPIC TISSUE SEALER G2 CURVED JAW FOR USE WITH G2 GENERATOR 5MM DIAMETER 35CM SHAFT LENGTH: Brand: ENSEAL

## (undated) DEVICE — STERILE MINOR LAPAROSCOPY PACK: Brand: CARDINAL HEALTH

## (undated) DEVICE — GLOVE PI ULTRA TOUCH SZ.7.5

## (undated) DEVICE — TROCAR: Brand: KII® SLEEVE